# Patient Record
Sex: FEMALE | URBAN - NONMETROPOLITAN AREA
[De-identification: names, ages, dates, MRNs, and addresses within clinical notes are randomized per-mention and may not be internally consistent; named-entity substitution may affect disease eponyms.]

---
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
## 2025-05-25 ENCOUNTER — OFFICE VISIT (OUTPATIENT)
Dept: URGENT CARE | Facility: CLINIC | Age: 20
End: 2025-05-25
Payer: COMMERCIAL

## 2025-05-25 VITALS
RESPIRATION RATE: 18 BRPM | BODY MASS INDEX: 26.89 KG/M2 | TEMPERATURE: 98.5 F | DIASTOLIC BLOOD PRESSURE: 83 MMHG | SYSTOLIC BLOOD PRESSURE: 116 MMHG | OXYGEN SATURATION: 99 % | HEART RATE: 75 BPM | WEIGHT: 192.8 LBS

## 2025-05-25 DIAGNOSIS — R06.02 SHORTNESS OF BREATH: ICD-10-CM

## 2025-05-25 DIAGNOSIS — R07.9 CHEST PAIN, UNSPECIFIED TYPE: Primary | ICD-10-CM

## 2025-05-25 PROCEDURE — G0382 LEV 3 HOSP TYPE B ED VISIT: HCPCS

## 2025-05-25 PROCEDURE — 99283 EMERGENCY DEPT VISIT LOW MDM: CPT

## 2025-05-25 RX ORDER — ACETAMINOPHEN 160 MG
TABLET,DISINTEGRATING ORAL
COMMUNITY
Start: 2020-11-01

## 2025-05-25 RX ORDER — FLUTICASONE PROPIONATE 50 MCG
SPRAY, SUSPENSION (ML) NASAL
COMMUNITY
Start: 2024-03-27

## 2025-05-25 RX ORDER — FEXOFENADINE HCL 180 MG/1
TABLET ORAL
COMMUNITY
Start: 2024-09-06

## 2025-05-25 NOTE — PROGRESS NOTES
[facility]        NAME: Katty St is a 19 y.o. female  : 2005    MRN: [id number]  DATE: May 25, 2025  TIME: 11:32 AM    Assessment and Plan   Chest pain, unspecified type [R07.9]  1. Chest pain, unspecified type  Transfer to other facility      2. Shortness of breath          Patient with 2 days of chest pain, shortness of breath, left arm numbness, feeling like her throat is closing.  Patient is well-appearing and stable vital signs on exam.  Given symptoms cannot rule out PE or allergic reaction.  Recommend ER for further evaluation, monitoring, treatment. Discussed care plan with patient and her mother who both verbalized understanding.  Wells score 0    Patient Instructions   Go directly to the ER    Follow up with PCP in 3-5 days.  Proceed to  ER if symptoms worsen.    If tests have been performed at [facility], our office will contact you with results if changes need to be made to the care plan discussed with you at the visit.  [facility]    Chief Complaint     Chief Complaint   Patient presents with    Cold Like Symptoms     Was seen last week in urgent care and given ANYI for sinus infection.  Had hives on right hand yesterday, today chest tightness with left shoulder pain.  Was seen in urgent care yesterday and recommended to have chest XR.  Here for chest XR and eval.  Taking allegra and sudafed until Thursday.  Has headache related to same.         History of Present Illness       19-year-old female presents with her mother for 2 days of chest pain, shortness of breath.  She reports the pain feels like an elephant sitting on her entire chest.  The shortness of breath comes and goes.  She reports hives to her left hand yesterday which resolved on their own.  Last night she noticed she felt like her throat was closing.  She denies difficulty swallowing or breathing.  She started Augmentin 5 days ago for sinus infection.  She has been on  Augmentin before with no issues.  She was also taking Sudafed and Allegra which she stopped due to  headaches.  She was seen at a different urgent care yesterday and was advised to go directly to the emergency room.  Patient reports she did not want to get sick in the ER as she has a vacation coming up so she did not go.  She presented to this clinic today for the same symptoms because she thought this clinic could do lab work.  She denies leg swelling, hormonal medications, recent travel or surgery.  She does have a fracture to her left wrist which is currently immobilized in a cast.  She denies any increased swelling or redness to her left arm or wrist.        Review of Systems   Review of Systems   Constitutional:  Negative for chills and fever.   HENT:  Positive for congestion. Negative for drooling, sore throat and trouble swallowing.    Eyes:  Negative for visual disturbance.   Respiratory:  Positive for chest tightness and shortness of breath. Negative for cough and wheezing.    Cardiovascular:  Positive for chest pain. Negative for palpitations.   Gastrointestinal:  Negative for abdominal pain, nausea and vomiting.   Neurological:  Positive for numbness. Negative for dizziness, syncope, facial asymmetry, speech difficulty and weakness.   Psychiatric/Behavioral:  Negative for confusion.          Current Medications     Current Medications[1]    Current Allergies     Allergies as of 05/25/2025 - Reviewed 05/25/2025   Allergen Reaction Noted    Spironolactone Hives, Rash, Shortness Of Breath, and Swelling 12/05/2023    Cetirizine Other (See Comments) 12/18/2023    Doxycycline Other (See Comments) 12/18/2023            The following portions of the patient's history were reviewed and updated as appropriate: allergies, current medications, past family history, past medical history, past social history, past surgical history and problem list.     Past Medical History[2]    Past Surgical History[3]    Family  History[4]      Medications have been verified.        Objective   /83 (BP Location: Left arm)   Pulse 75   Temp 98.5 °F (36.9 °C) (Skin)   Resp 18   Wt 87.5 kg (192 lb 12.8 oz)   LMP 04/25/2025 (Approximate)   SpO2 99%   BMI 26.89 kg/m²   Patient's last menstrual period was 04/25/2025 (approximate).       Physical Exam     Physical Exam  Vitals and nursing note reviewed.   Constitutional:       General: She is not in acute distress.     Appearance: Normal appearance. She is not ill-appearing, toxic-appearing or diaphoretic.   HENT:      Head: Normocephalic and atraumatic.      Right Ear: Tympanic membrane, ear canal and external ear normal.      Left Ear: Tympanic membrane, ear canal and external ear normal.      Nose: Congestion present.      Mouth/Throat:      Mouth: Mucous membranes are moist.      Pharynx: Oropharynx is clear. No oropharyngeal exudate or posterior oropharyngeal erythema.     Eyes:      Extraocular Movements: Extraocular movements intact.      Conjunctiva/sclera: Conjunctivae normal.      Pupils: Pupils are equal, round, and reactive to light.       Cardiovascular:      Rate and Rhythm: Normal rate and regular rhythm.      Heart sounds: Normal heart sounds. No murmur heard.     No friction rub. No gallop.   Pulmonary:      Effort: Pulmonary effort is normal. No respiratory distress.      Breath sounds: Normal breath sounds. No stridor. No wheezing, rhonchi or rales.   Chest:      Chest wall: No tenderness.     Musculoskeletal:      Right lower leg: No edema.      Left lower leg: No edema.      Comments: Cast to left wrist.     Skin:     General: Skin is warm and dry.      Capillary Refill: Capillary refill takes less than 2 seconds.     Neurological:      General: No focal deficit present.      Mental Status: She is alert.      GCS: GCS eye subscore is 4. GCS verbal subscore is 5. GCS motor subscore is 6.      Cranial Nerves: Cranial nerves 2-12 are intact. No cranial nerve deficit,  dysarthria or facial asymmetry.      Sensory: Sensation is intact.      Motor: Motor function is intact. No weakness or pronator drift.      Coordination: Coordination is intact.      Gait: Gait is intact.     Psychiatric:         Mood and Affect: Mood normal.         Behavior: Behavior normal.                        [1]   Current Outpatient Medications:     amoxicillin-clavulanate (AUGMENTIN) 875-125 mg per tablet, Take 1 tablet by mouth 2 (two) times a day, Disp: , Rfl:     Cholecalciferol (Vitamin D3) 50 MCG (2000 UT) capsule, , Disp: , Rfl:     fexofenadine (Allegra Allergy) 180 MG tablet, , Disp: , Rfl:     fluticasone (FLONASE) 50 mcg/act nasal spray, , Disp: , Rfl:     Ferrous Sulfate (IRON PO), Take 1 tablet by mouth daily, Disp: , Rfl:     methylPREDNISolone 4 MG tablet therapy pack, Use as directed on package, Disp: 1 each, Rfl: 0  [2] No past medical history on file.  [3] No past surgical history on file.  [4] No family history on file.